# Patient Record
Sex: FEMALE | Race: WHITE | URBAN - METROPOLITAN AREA
[De-identification: names, ages, dates, MRNs, and addresses within clinical notes are randomized per-mention and may not be internally consistent; named-entity substitution may affect disease eponyms.]

---

## 2018-09-24 ENCOUNTER — EMERGENCY (EMERGENCY)
Facility: HOSPITAL | Age: 68
LOS: 0 days | Discharge: HOME | End: 2018-09-24
Attending: EMERGENCY MEDICINE | Admitting: EMERGENCY MEDICINE

## 2018-09-24 VITALS
SYSTOLIC BLOOD PRESSURE: 174 MMHG | OXYGEN SATURATION: 100 % | TEMPERATURE: 96 F | HEART RATE: 78 BPM | DIASTOLIC BLOOD PRESSURE: 83 MMHG | RESPIRATION RATE: 18 BRPM

## 2018-09-24 DIAGNOSIS — Z96.649 PRESENCE OF UNSPECIFIED ARTIFICIAL HIP JOINT: ICD-10-CM

## 2018-09-24 DIAGNOSIS — Z96.649 PRESENCE OF UNSPECIFIED ARTIFICIAL HIP JOINT: Chronic | ICD-10-CM

## 2018-09-24 DIAGNOSIS — S52.501A UNSPECIFIED FRACTURE OF THE LOWER END OF RIGHT RADIUS, INITIAL ENCOUNTER FOR CLOSED FRACTURE: ICD-10-CM
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
## 2018-09-24 DIAGNOSIS — M25.539 PAIN IN UNSPECIFIED WRIST: ICD-10-CM
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
## 2018-09-24 DIAGNOSIS — Z88.0 ALLERGY STATUS TO PENICILLIN: ICD-10-CM

## 2018-09-24 RX ORDER — ALPRAZOLAM 0.25 MG
0.25 TABLET ORAL ONCE
Qty: 0 | Refills: 0 | Status: DISCONTINUED | OUTPATIENT
Start: 2018-09-24 | End: 2018-09-24

## 2018-09-24 RX ORDER — IBUPROFEN 200 MG
600 TABLET ORAL ONCE
Qty: 0 | Refills: 0 | Status: COMPLETED | OUTPATIENT
Start: 2018-09-24 | End: 2018-09-24

## 2018-09-24 RX ADMIN — Medication 600 MILLIGRAM(S): at 19:48

## 2018-09-24 RX ADMIN — Medication 0.25 MILLIGRAM(S): at 19:48

## 2018-09-24 NOTE — ED PROVIDER NOTE - PHYSICAL EXAMINATION
GEN: Alert & Oriented x 3, No acute distress. Calm, appropriate.  Head and Neck: Normocephalic, atraumatic. No cervical lymphadenopathy. Trachea midline. No thyromegaly.  ENT:Oral mucosa pink, moist without lesions. No pharyngeal injection noted. No exudate. TM clear bilaterally.  Eyes: PERRLA. EOMI. No conjunctival injection. No scleral icterus. Vision 20/20  RESP: Lungs clear to auscult bilat. no wheezes, rhonchi or rales. No retractions. Equal air entry.  CARDIO: regular rate and rhythm, no murmurs, rubs or gallops. Normal S1, S2. No JVD or hepatojugular reflex noted. Radial pulses 2+ bilaterally. No lower extremity edema.  ABD: Soft, Nondistended. BS +4Q. No rebound tenderness/guarding. No organomegaly. No pulsatile mass. No tenderness with light and deep palpation.  MS: normal ROM.  SKIN: no rashes/lesions, no petechiae, no ecchymosis.  NEURO: CN II-XII grossly intact. Strength + sensation intact x 4 extremities. Speech and cognition normal.  PSYCH: Appropriate affect. No signs of depression, anxiety. GEN: Alert & Oriented x 3, No acute distress. Calm, appropriate.  RESP: Lungs clear to auscult bilat. no wheezes, rhonchi or rales. No retractions. Equal air entry.  CARDIO: regular rate and rhythm, no murmurs, rubs or gallops. Normal S1, S2. Radial pulses 2+ bilaterally.   MS: Obvious deformity and swelling to right wrist. Tenderness with palpation of right wrist. No tenderness with palpation of right shoulder and elbow. Full ROM of right elbow. Full ROM of right knee. No tenderness   SKIN: no ecchymosis.  NEURO: Sensation intact to right hand/wrist GEN: Alert & Oriented x 3, No acute distress. Calm, appropriate.  RESP: Lungs clear to auscult bilat. no wheezes, rhonchi or rales. No retractions. Equal air entry.  CARDIO: regular rate and rhythm, no murmurs, rubs or gallops. Normal S1, S2. Radial pulses 2+ bilaterally.   MS: Obvious deformity and swelling to right wrist. Tenderness with palpation of right wrist. No tenderness with palpation of right shoulder and elbow. Full ROM of right elbow. Full ROM of right knee. No tenderness with palpation of right knee.   SKIN: no ecchymosis.  NEURO: Sensation intact to right hand/wrist

## 2018-09-24 NOTE — ED PROVIDER NOTE - NS ED ROS FT
GEN: (-) fever, (-) chills  HEENT: (-) vision changes, (-) HA,   CV: (-) chest pain, (-) palpitations, (-) edema  PULM: (-) cough, (-) wheezing, (-) dyspnea, (-) orthopnea, (-) hemoptysis   NEURO: (-) weakness, (+) Numbness (-) syncope, (-) seizure  MS: (-) back pain, (+) Wrist pain, (-)myalgias, (-) swelling  SKIN: (-) rashes, (-) new lesions, (-) ecchymosis

## 2018-09-24 NOTE — ED PROCEDURE NOTE - CPROC ED POST PROC CARE GUIDE1
Elevate the injured extremity as instructed./instructed pt to follow up with ortho/Verbal/written post procedure instructions were given to patient/caregiver./Keep the cast/splint/dressing clean and dry.

## 2018-09-24 NOTE — ED PROVIDER NOTE - OBJECTIVE STATEMENT
The patient is a 67y Female presenting with right wrist pain following a fall. Patient stated she tripped and fell and caught her fall by her right wrist and right knee. She denies hitting head, LOC, & knee pain. The patient is a 67y Female presenting with right wrist pain following a fall. Patient stated she tripped and fell and caught her fall by her right hand and right knee. She denies hitting head, LOC, numbness/tingling in hand and fingers & knee pain. She endorses numbness around the wrist.

## 2018-09-24 NOTE — ED PROVIDER NOTE - PROGRESS NOTE DETAILS
JENIFER Silva: I was directly involved in the care and management of this patient while supervising PA Fellow Patient refused to have wrist hung because she wants to leave the ED as soon as possible due to social reasons. Patient agreed to hematoma block, then reduction.

## 2018-09-24 NOTE — ED PROVIDER NOTE - ATTENDING CONTRIBUTION TO CARE
67 y F no PMH pw right wrist pain and deformity after FOOSH. No other injury. CMS intact. exam shows dorsal angulation of distal forearm with greater pain in radial aspect. Will perform hematoma block and reduce the fracture with splinting, reassessment, and discharge with orthopedics follow up with return precautions.
